# Patient Record
Sex: MALE | Race: WHITE | URBAN - METROPOLITAN AREA
[De-identification: names, ages, dates, MRNs, and addresses within clinical notes are randomized per-mention and may not be internally consistent; named-entity substitution may affect disease eponyms.]

---

## 2017-10-31 PROBLEM — S72.001A CLOSED DISPLACED FRACTURE OF RIGHT FEMORAL NECK (HCC): Status: ACTIVE | Noted: 2017-10-31

## 2017-11-21 ENCOUNTER — OFFICE VISIT (OUTPATIENT)
Dept: ORTHOPEDIC SURGERY | Age: 82
End: 2017-11-21

## 2017-11-21 VITALS — BODY MASS INDEX: 19.6 KG/M2 | HEIGHT: 70 IN | WEIGHT: 136.91 LBS

## 2017-11-21 DIAGNOSIS — S72.001A CLOSED DISPLACED FRACTURE OF RIGHT FEMORAL NECK (HCC): ICD-10-CM

## 2017-11-21 DIAGNOSIS — M25.551 HIP PAIN, RIGHT: Primary | ICD-10-CM

## 2017-11-21 PROCEDURE — 99024 POSTOP FOLLOW-UP VISIT: CPT | Performed by: ORTHOPAEDIC SURGERY

## 2017-11-21 PROCEDURE — 73502 X-RAY EXAM HIP UNI 2-3 VIEWS: CPT | Performed by: ORTHOPAEDIC SURGERY

## 2017-11-21 RX ORDER — CLOPIDOGREL BISULFATE 75 MG/1
75 TABLET ORAL
COMMUNITY
Start: 2017-04-12

## 2017-11-21 RX ORDER — FINASTERIDE 5 MG/1
5 TABLET, FILM COATED ORAL
COMMUNITY
End: 2017-11-21 | Stop reason: SDUPTHER

## 2017-11-21 RX ORDER — ASPIRIN 81 MG/1
81 TABLET ORAL
COMMUNITY
Start: 2017-05-16

## 2017-11-21 RX ORDER — ASPIRIN 81 MG
1 TABLET, DELAYED RELEASE (ENTERIC COATED) ORAL
COMMUNITY

## 2017-11-21 RX ORDER — ATORVASTATIN CALCIUM 40 MG/1
40 TABLET, FILM COATED ORAL
COMMUNITY
End: 2017-11-21 | Stop reason: SDUPTHER

## 2017-11-21 RX ORDER — GEMFIBROZIL 600 MG/1
600 TABLET, FILM COATED ORAL
COMMUNITY
End: 2017-11-21 | Stop reason: SDUPTHER

## 2017-11-21 RX ORDER — OMEPRAZOLE 20 MG/1
20 CAPSULE, DELAYED RELEASE ORAL
COMMUNITY
Start: 2017-01-04

## 2017-11-21 NOTE — PROGRESS NOTES
lesions. Strength and tone are normal.      Radiology:     X-rays obtained and reviewed in office:  AP pelvis and lateral of the right hip demonstrate status post hemiarthroplasty with well fixed implant stable, no acute changes      Assessment:  80year-old male 3 weeks postop status post right hip hemiarthroplasty for femoral neck fracture    Office Procedures:  Orders Placed This Encounter   Procedures    XR HIP RIGHT (2-3 VIEWS)     [id number]     Order Specific Question:   Reason for exam:     Answer:   Pain       Plan:   1. He can now transition to weight-bear as tolerated right lower extremity with posterior hip dislocation precautions  2. Continue with pain management per his facility as needed and should transition to over-the-counter medications and is able. 3.  He will continue DVT prophylaxis until 4 weeks postop  4. I will see him back in 4 weeks for repeat evaluation and if there are any issues in the interim he should contact the office. Hopefully he will be able to be discharged to home soon. He states he is hoping to be a little leave tomorrow      Voice Recognition Dictation disclaimer: Please note that portions of this chart were generated using Dragon dictation software. Although every effort was made to ensure the accuracy of this automated transcription, some errors in transcription may have occurred.

## 2017-12-19 ENCOUNTER — OFFICE VISIT (OUTPATIENT)
Dept: ORTHOPEDIC SURGERY | Age: 82
End: 2017-12-19

## 2017-12-19 VITALS — HEIGHT: 70 IN | BODY MASS INDEX: 19.6 KG/M2 | WEIGHT: 136.91 LBS

## 2017-12-19 DIAGNOSIS — S72.001A CLOSED DISPLACED FRACTURE OF RIGHT FEMORAL NECK (HCC): ICD-10-CM

## 2017-12-19 DIAGNOSIS — M25.551 PAIN OF RIGHT HIP JOINT: Primary | ICD-10-CM

## 2017-12-19 PROCEDURE — 73502 X-RAY EXAM HIP UNI 2-3 VIEWS: CPT | Performed by: ORTHOPAEDIC SURGERY

## 2017-12-19 PROCEDURE — 99024 POSTOP FOLLOW-UP VISIT: CPT | Performed by: ORTHOPAEDIC SURGERY

## 2017-12-19 RX ORDER — OXYCODONE HYDROCHLORIDE AND ACETAMINOPHEN 5; 325 MG/1; MG/1
1 TABLET ORAL EVERY 6 HOURS PRN
Qty: 28 TABLET | Refills: 0 | Status: SHIPPED | OUTPATIENT
Start: 2017-12-19 | End: 2017-12-26

## 2017-12-19 NOTE — PROGRESS NOTES
Chief Complaint  Follow-up (Right hip Sx 11/1  hemiarthroplasty for femoral neck fracture)      History of Present Illness:  Delvin Hernandez is a 80 y.o. y/o male who presents post op 6 weeks status post right hip hemiarthroplasty for femoral neck fracture. Overall he is doing well. He continues to have pain in the lateral aspect of his hip. He is also describing pain because ROM from his buttock down his leg and into his toes with some numbness and tingling. He states that was present before the injury. He has had no new falls or other injuries. He has been doing home therapy. He is using a walker to walk, but states at other times he is not using any assistance. He denies any fevers, chills, night sweats, nausea, vomiting. He denies excessive numbness, tingling, calf pain, chest pain, shortness of breath. He denies erythema, warmth, excessive drainage from the surgical site. Vital Signs  There were no vitals filed for this visit. General Exam:   Constitutional: Patient is adequately groomed with no evidence of malnutrition  Mental Status: The patient is oriented to time, place and person. The patient's mood and affect are appropriate. Lymphatic: The lymphatic examination bilaterally reveals all areas to be without enlargement or induration. Neurological: The patient has good coordination. There is no weakness or sensory deficit.      Gait: Walker, normal    Right hip  Examination  Inspection:  Incision is well-healed without erythema or drainage    Palpation:  Mild tenderness near the operative site    Range of Motion:  Hip flexion 110, internal rotation at 90° of flexion to 20, external rotation at 90° of flexion to 40 with some lateral sided pain, no groin pain    Sensation: In tact to light touch all nerve distributions     Strength:  Normal motor exam limited secondary to pain    Special Tests:  None performed    Skin: There are no additional worrisome rashes, ulcerations or lesions. Circulation normal    Additional Examinations:  Left Lower Extremity: Examination of the left lower extremity does not show any tenderness, deformity or injury. Range of motion is unremarkable. There is no gross instability. There are no rashes, ulcerations or lesions. Strength and tone are normal.      Radiology:     X-rays obtained and reviewed in office:  AP pelvis and lateral right hip demonstrate status post right hip hemiarthroplasty with stable implant in no acute fracture or any findings      Assessment:  80year-old male 6 weeks status post right hip hemiarthroplasty for femoral neck fracture    Office Procedures:  Orders Placed This Encounter   Procedures    Hip 2-3 Vw W Pelvis Right       Plan:   -He can continue weightbearing as tolerated at this time and uses walker for assistance  -He should continue with home therapy  -I will refill percocet at this time secondary to pain while he is trying to sleep, we discussed that we will transition office and other medications  An [facility] report was run. The results were reviewed. The results of this report were used in formulating a treatment plan for this patient. Report was Kept confidential and disposed of appropriately.  -I will see him back in 6 weeks for repeat evaluation and x-rays and if there are any issues in the interim he should contact the office      Voice Recognition Dictation disclaimer: Please note that portions of this chart were generated using Dragon dictation software. Although every effort was made to ensure the accuracy of this automated transcription, some errors in transcription may have occurred.

## 2018-03-20 ENCOUNTER — OFFICE VISIT (OUTPATIENT)
Dept: ORTHOPEDIC SURGERY | Age: 83
End: 2018-03-20

## 2018-03-20 VITALS — WEIGHT: 136.91 LBS | BODY MASS INDEX: 19.6 KG/M2 | HEIGHT: 70 IN

## 2018-03-20 DIAGNOSIS — M25.551 HIP PAIN, RIGHT: Primary | ICD-10-CM

## 2018-03-20 PROCEDURE — G8420 CALC BMI NORM PARAMETERS: HCPCS | Performed by: ORTHOPAEDIC SURGERY

## 2018-03-20 PROCEDURE — 99212 OFFICE O/P EST SF 10 MIN: CPT | Performed by: ORTHOPAEDIC SURGERY

## 2018-03-20 PROCEDURE — G8427 DOCREV CUR MEDS BY ELIG CLIN: HCPCS | Performed by: ORTHOPAEDIC SURGERY

## 2018-03-20 PROCEDURE — 1123F ACP DISCUSS/DSCN MKR DOCD: CPT | Performed by: ORTHOPAEDIC SURGERY

## 2018-03-20 PROCEDURE — 4004F PT TOBACCO SCREEN RCVD TLK: CPT | Performed by: ORTHOPAEDIC SURGERY

## 2018-03-20 PROCEDURE — 4040F PNEUMOC VAC/ADMIN/RCVD: CPT | Performed by: ORTHOPAEDIC SURGERY

## 2018-03-20 PROCEDURE — G8482 FLU IMMUNIZE ORDER/ADMIN: HCPCS | Performed by: ORTHOPAEDIC SURGERY

## 2018-03-20 NOTE — PROGRESS NOTES
Chief Complaint  Follow-up (Right Hip: Hemiarthroplasty for Femoral neck Fx, Sx 11/1/17 ( 4 months out); has constantly dull pain that increase at night with rest, but goes down when he is active throughtout the day )      History of Present Illness:  Valerie Fernando is a 80 y.o. y/o male who presents post op 4.5 months status post right hip hemiarthroplasty. He continues to have some achy pain in bilateral legs as well as numbness and tingling in his feet. He says that was actually present before his fall. He's also has a little bit of achy pain in his right hip at times. He denies any new falls or injuries. He denies any bowel or bladder dysfunction at this time. He says he takes Tylenol reduces as long as he is up and moving along he has minimal pain, but he has pain at night when lying down. He does state he is having some difficulty with balance. Vital Signs  There were no vitals filed for this visit. General Exam:   Constitutional: Patient is adequately groomed with no evidence of malnutrition  Mental Status: The patient is oriented to time, place and person. The patient's mood and affect are appropriate. Lymphatic: The lymphatic examination bilaterally reveals all areas to be without enlargement or induration. Neurological: The patient has good coordination. There is no weakness or sensory deficit. Gait: Normal    Right hip  Examination  Inspection:  No swelling    Palpation:  Minimal tenderness near the operative site    Range of Motion:  Hip flexion 110, internal rotation at 90° of flexion to 20, external rotation at 90° of flexion to 40 with some lateral sided pain, no groin pain    Sensation: In tact to light touch all nerve distributions with decreased sensation L5 and S1 nerve distributions compared to L4    Strength:   Motor 5 out of 5 dorsiflexion, plantar flexion, EHL, knee flexion, knee extension, hip flexion    Special Tests:  None performed    Skin: There are no additional worrisome

## 2018-04-10 ENCOUNTER — OFFICE VISIT (OUTPATIENT)
Dept: ORTHOPEDIC SURGERY | Age: 83
End: 2018-04-10

## 2018-04-10 VITALS
WEIGHT: 136.91 LBS | HEIGHT: 70 IN | SYSTOLIC BLOOD PRESSURE: 153 MMHG | HEART RATE: 74 BPM | BODY MASS INDEX: 19.6 KG/M2 | DIASTOLIC BLOOD PRESSURE: 75 MMHG

## 2018-04-10 DIAGNOSIS — M47.816 SPONDYLOSIS OF LUMBAR REGION WITHOUT MYELOPATHY OR RADICULOPATHY: ICD-10-CM

## 2018-04-10 DIAGNOSIS — R20.2 PARESTHESIA OF BOTH LOWER EXTREMITIES: ICD-10-CM

## 2018-04-10 DIAGNOSIS — M51.36 DDD (DEGENERATIVE DISC DISEASE), LUMBAR: Primary | ICD-10-CM

## 2018-04-10 DIAGNOSIS — M54.50 PAIN OF LUMBAR SPINE: ICD-10-CM

## 2018-04-10 PROCEDURE — G8420 CALC BMI NORM PARAMETERS: HCPCS | Performed by: PHYSICAL MEDICINE & REHABILITATION

## 2018-04-10 PROCEDURE — G8427 DOCREV CUR MEDS BY ELIG CLIN: HCPCS | Performed by: PHYSICAL MEDICINE & REHABILITATION

## 2018-04-10 PROCEDURE — 99203 OFFICE O/P NEW LOW 30 MIN: CPT | Performed by: PHYSICAL MEDICINE & REHABILITATION

## 2018-04-10 RX ORDER — GABAPENTIN 100 MG/1
100 CAPSULE ORAL NIGHTLY
Qty: 30 CAPSULE | Refills: 0 | Status: SHIPPED | OUTPATIENT
Start: 2018-04-10 | End: 2018-05-10